# Patient Record
Sex: FEMALE | Race: WHITE | URBAN - METROPOLITAN AREA
[De-identification: names, ages, dates, MRNs, and addresses within clinical notes are randomized per-mention and may not be internally consistent; named-entity substitution may affect disease eponyms.]

---

## 2019-12-03 ENCOUNTER — APPOINTMENT (OUTPATIENT)
Dept: LAB | Age: 29
End: 2019-12-03
Attending: NURSE PRACTITIONER
Payer: COMMERCIAL

## 2019-12-03 ENCOUNTER — OFFICE VISIT (OUTPATIENT)
Dept: FAMILY MEDICINE CLINIC | Facility: CLINIC | Age: 29
End: 2019-12-03

## 2019-12-03 VITALS
WEIGHT: 223.19 LBS | SYSTOLIC BLOOD PRESSURE: 120 MMHG | BODY MASS INDEX: 39.06 KG/M2 | OXYGEN SATURATION: 99 % | DIASTOLIC BLOOD PRESSURE: 68 MMHG | RESPIRATION RATE: 16 BRPM | HEIGHT: 63.25 IN | HEART RATE: 70 BPM | TEMPERATURE: 98 F

## 2019-12-03 DIAGNOSIS — R19.5 DARK STOOLS: ICD-10-CM

## 2019-12-03 DIAGNOSIS — K21.9 GASTROESOPHAGEAL REFLUX DISEASE, ESOPHAGITIS PRESENCE NOT SPECIFIED: Primary | ICD-10-CM

## 2019-12-03 PROCEDURE — 85025 COMPLETE CBC W/AUTO DIFF WBC: CPT | Performed by: NURSE PRACTITIONER

## 2019-12-03 PROCEDURE — 80053 COMPREHEN METABOLIC PANEL: CPT | Performed by: NURSE PRACTITIONER

## 2019-12-03 PROCEDURE — 36415 COLL VENOUS BLD VENIPUNCTURE: CPT | Performed by: NURSE PRACTITIONER

## 2019-12-03 PROCEDURE — 99204 OFFICE O/P NEW MOD 45 MIN: CPT | Performed by: NURSE PRACTITIONER

## 2019-12-03 RX ORDER — ESCITALOPRAM OXALATE 20 MG/1
20 TABLET ORAL
Refills: 6 | COMMUNITY
Start: 2019-11-11 | End: 2020-09-16

## 2019-12-03 RX ORDER — BUPROPION HYDROCHLORIDE 300 MG/1
300 TABLET ORAL
Refills: 6 | COMMUNITY
Start: 2019-11-20 | End: 2020-09-16

## 2019-12-03 NOTE — PATIENT INSTRUCTIONS
Patient advised to avoid large meals especially high-fat meals 3 to 4 hours before bedtime, lose weight, avoid mints, chocolate, and alcohol.   Patient should avoid aspirin, NSAIDs, caffeine, carbonated beverages and other aggravating foods that are kno

## 2019-12-03 NOTE — PROGRESS NOTES
HPI:    Patient ID: Trey Joaquin is a 30year old female. Patient presents to the clinic today with complaints of heartburn and changes in stool that started about 1 month ago.   Patient reports she takes a lot of ibuprofen for headaches, but has been PHYSICAL EXAM:   Physical Exam   Constitutional: She is oriented to person, place, and time. She appears well-developed and well-nourished. No distress. HENT:   Head: Atraumatic. Eyes: Pupils are equal, round, and reactive to light.    Neck: Normal ra

## 2019-12-04 ENCOUNTER — TELEPHONE (OUTPATIENT)
Dept: FAMILY MEDICINE CLINIC | Facility: CLINIC | Age: 29
End: 2019-12-04

## 2019-12-04 NOTE — TELEPHONE ENCOUNTER
----- Message from JAMIE Robbins sent at 12/4/2019  8:28 AM CST -----  Results reviewed. Tests show no significant abnormalities, we were concerned about anemia but your blood counts do not indicate that she has anemia. Please inform patient.  Thank

## 2020-01-30 ENCOUNTER — OFFICE VISIT (OUTPATIENT)
Dept: FAMILY MEDICINE CLINIC | Facility: CLINIC | Age: 30
End: 2020-01-30

## 2020-01-30 VITALS
WEIGHT: 225 LBS | TEMPERATURE: 99 F | OXYGEN SATURATION: 98 % | DIASTOLIC BLOOD PRESSURE: 68 MMHG | BODY MASS INDEX: 39.37 KG/M2 | RESPIRATION RATE: 20 BRPM | HEART RATE: 79 BPM | SYSTOLIC BLOOD PRESSURE: 110 MMHG | HEIGHT: 63.25 IN

## 2020-01-30 DIAGNOSIS — J00 ACUTE NASOPHARYNGITIS: Primary | ICD-10-CM

## 2020-01-30 PROCEDURE — 99213 OFFICE O/P EST LOW 20 MIN: CPT | Performed by: NURSE PRACTITIONER

## 2020-01-30 RX ORDER — AMOXICILLIN 875 MG/1
875 TABLET, COATED ORAL 2 TIMES DAILY
Qty: 20 TABLET | Refills: 0 | Status: SHIPPED | OUTPATIENT
Start: 2020-01-30 | End: 2020-02-09

## 2020-01-30 NOTE — PROGRESS NOTES
Neil Quiñonez is a 30year old female.   Patient presents with:  Headache      HPI:   Complaints of headache for the last 4-5 months - has a stabbing pain to right eye /temple - lasts for 3 days- constant   Leaves her exhausted  History of migraines Cuff Size: large)   Pulse 79   Temp 99 °F (37.2 °C) (Tympanic)   Resp 20   Ht 63.25\"   Wt 225 lb (102.1 kg)   SpO2 98%   BMI 39.54 kg/m²   GENERAL: well developed, well nourished,in no apparent distress  SKIN: no rashes,no suspicious lesions  HEENT: atrau

## 2020-01-30 NOTE — PATIENT INSTRUCTIONS
Rest, increase fluids, salt water gargles ,juhi pot (use distilled water) or saline nasal spray, Zyrtec- Rhinocort nasal spay 2 sprays each nostril once a day,  Tylenol/Ibuprofen, follow up if symptoms persist or increase.       Chiropractor, Dr. Laura Barillas

## 2020-09-16 PROBLEM — F41.9 ANXIETY DISORDER, UNSPECIFIED: Status: ACTIVE | Noted: 2020-09-16

## 2020-09-16 PROBLEM — F33.42 RECURRENT MAJOR DEPRESSIVE DISORDER, IN FULL REMISSION (HCC): Status: ACTIVE | Noted: 2020-09-16
